# Patient Record
Sex: MALE | Race: WHITE | NOT HISPANIC OR LATINO | Employment: PART TIME | URBAN - METROPOLITAN AREA
[De-identification: names, ages, dates, MRNs, and addresses within clinical notes are randomized per-mention and may not be internally consistent; named-entity substitution may affect disease eponyms.]

---

## 2017-01-09 ENCOUNTER — HOSPITAL ENCOUNTER (OUTPATIENT)
Dept: MRI IMAGING | Facility: HOSPITAL | Age: 22
Discharge: HOME | End: 2017-01-09

## 2017-01-09 ENCOUNTER — HOSPITAL ENCOUNTER (OUTPATIENT)
Dept: MRI IMAGING | Facility: HOSPITAL | Age: 22
Discharge: HOME | End: 2017-01-09
Admitting: PSYCHIATRY & NEUROLOGY

## 2017-01-09 DIAGNOSIS — R51.9 HEADACHE, UNSPECIFIED HEADACHE TYPE: ICD-10-CM

## 2017-01-09 DIAGNOSIS — G93.5 CHIARI I MALFORMATION (HCC): ICD-10-CM

## 2017-01-09 PROCEDURE — 72141 MRI NECK SPINE W/O DYE: CPT

## 2017-01-09 PROCEDURE — 70551 MRI BRAIN STEM W/O DYE: CPT

## 2017-01-09 PROCEDURE — 72146 MRI CHEST SPINE W/O DYE: CPT

## 2018-05-16 ENCOUNTER — TRANSCRIBE ORDERS (OUTPATIENT)
Dept: ADMINISTRATIVE | Facility: HOSPITAL | Age: 23
End: 2018-05-16

## 2018-05-16 DIAGNOSIS — G93.5 CHIARI MALFORMATION TYPE I (HCC): Primary | ICD-10-CM

## 2018-05-18 ENCOUNTER — APPOINTMENT (OUTPATIENT)
Dept: MRI IMAGING | Facility: HOSPITAL | Age: 23
End: 2018-05-18

## 2018-07-12 ENCOUNTER — HOSPITAL ENCOUNTER (OUTPATIENT)
Dept: MRI IMAGING | Facility: HOSPITAL | Age: 23
Discharge: HOME | End: 2018-07-12
Admitting: PSYCHIATRY & NEUROLOGY

## 2018-07-12 DIAGNOSIS — G93.5 CHIARI MALFORMATION TYPE I (HCC): ICD-10-CM

## 2018-07-12 PROCEDURE — 70551 MRI BRAIN STEM W/O DYE: CPT

## 2018-07-30 RX ORDER — TIZANIDINE 2 MG/1
TABLET ORAL
COMMUNITY
End: 2018-07-30 | Stop reason: SDUPTHER

## 2018-07-30 RX ORDER — TIZANIDINE 2 MG/1
TABLET ORAL
COMMUNITY
End: 2018-07-31

## 2018-07-31 ENCOUNTER — OFFICE VISIT (OUTPATIENT)
Dept: NEUROSURGERY | Facility: CLINIC | Age: 23
End: 2018-07-31

## 2018-07-31 VITALS
DIASTOLIC BLOOD PRESSURE: 72 MMHG | HEIGHT: 72 IN | WEIGHT: 202.6 LBS | TEMPERATURE: 98.6 F | BODY MASS INDEX: 27.44 KG/M2 | SYSTOLIC BLOOD PRESSURE: 128 MMHG

## 2018-07-31 DIAGNOSIS — Q07.00 ARNOLD-CHIARI MALFORMATION (HCC): Primary | ICD-10-CM

## 2018-07-31 PROCEDURE — 99243 OFF/OP CNSLTJ NEW/EST LOW 30: CPT | Performed by: NEUROLOGICAL SURGERY

## 2018-07-31 RX ORDER — DIVALPROEX SODIUM 250 MG/1
TABLET, DELAYED RELEASE ORAL
Refills: 3 | COMMUNITY
Start: 2018-07-12 | End: 2022-05-05

## 2018-07-31 NOTE — PATIENT INSTRUCTIONS
If you need anything or have questions call Dr. Zambrano on a Monday or Tuesday with an update.   Ask for  Stacy  and leave a message for  Dr. Zambrano.  He will call you back at the end of the day as soon as he can.     [phone]

## 2018-07-31 NOTE — PROGRESS NOTES
Destin Newman  1995  [id number]      Chief Complaint   Patient presents with   • Headache   • Dizziness   • Extremity Weakness       HISTORY OF PRESENT ILLNESS:   This is a 23-year-old male who presents with classic symptoms of a symptomatic type to Arnold-Chiari malformation.  He has headaches, vertigo, occasionally will lose control of his arms and legs, fainting disc, has had difficulty at times with swallowing and tools.  The symptoms are worse with any type of Valsalva maneuver such as sneezing coughing bowel movements or bending over.  MRI was performed and shows the presence of type II Chiari malformation he is referred for neurosurgical consultation.    Past Medical History:   Diagnosis Date   • HA (headache)        History reviewed. No pertinent surgical history.    Family History   Problem Relation Age of Onset   • Diabetes Father    • Hypertension Father    • Cancer Other        Social History     Social History   • Marital status: Single     Spouse name: N/A   • Number of children: N/A   • Years of education: N/A     Occupational History   • Not on file.     Social History Main Topics   • Smoking status: Never Smoker   • Smokeless tobacco: Never Used   • Alcohol use Yes   • Drug use: No   • Sexual activity: Defer     Other Topics Concern   • Not on file     Social History Narrative   • No narrative on file       No Known Allergies      Current Outpatient Prescriptions:   •  divalproex (DEPAKOTE) 250 MG DR tablet, TAKE 1 TABLET BY MOUTH EVERYDAY AT BEDTIME, Disp: , Rfl: 3    Review of Systems   Constitutional: Negative for activity change, appetite change, chills, diaphoresis, fatigue, fever and unexpected weight change.   HENT: Positive for drooling, tinnitus and trouble swallowing. Negative for congestion, dental problem, ear discharge, ear pain, facial swelling, hearing loss, mouth sores, nosebleeds, postnasal drip, rhinorrhea, sinus pressure, sneezing, sore throat and voice change.    Eyes:  "Negative for photophobia, pain, discharge, redness, itching and visual disturbance.   Respiratory: Negative for apnea, cough, choking, chest tightness, shortness of breath, wheezing and stridor.    Cardiovascular: Negative for chest pain, palpitations and leg swelling.   Gastrointestinal: Negative for abdominal distention, abdominal pain, anal bleeding, blood in stool, constipation, diarrhea, nausea, rectal pain and vomiting.   Endocrine: Negative for cold intolerance, heat intolerance, polydipsia, polyphagia and polyuria.   Genitourinary: Negative for decreased urine volume, difficulty urinating, dysuria, enuresis, flank pain, frequency, genital sores, hematuria and urgency.   Musculoskeletal: Negative for arthralgias, back pain, gait problem, joint swelling, myalgias, neck pain and neck stiffness.   Skin: Negative for color change, pallor, rash and wound.   Allergic/Immunologic: Negative for environmental allergies, food allergies and immunocompromised state.   Neurological: Positive for dizziness, weakness, light-headedness and headaches. Negative for tremors, seizures, syncope, facial asymmetry, speech difficulty and numbness.   Hematological: Negative for adenopathy. Does not bruise/bleed easily.   Psychiatric/Behavioral: Negative for agitation, behavioral problems, confusion, decreased concentration, dysphoric mood, hallucinations, self-injury, sleep disturbance and suicidal ideas. The patient is not nervous/anxious and is not hyperactive.        Vitals:    07/31/18 1351   BP: 128/72   BP Location: Left arm   Patient Position: Sitting   Cuff Size: Adult   Temp: 98.6 °F (37 °C)   TempSrc: Temporal Artery    Weight: 91.9 kg (202 lb 9.6 oz)   Height: 182.9 cm (72.01\")       Neurological Examination:    Mental status/speech: The patient is alert and oriented.  Speech is clear without aphysia or dysarthria.  No overt cognitive deficits.    Cranial nerve examination:    Olfaction: Smell is intact.  Vision: Vision is " intact without visual field abnormalities.  Funduscopic examination is normal.  No pupillary irregularity.  Ocular motor examination: The extraocular muscles are intact.  There is no diplopia.  The pupil is round and reactive to both light and accommodation.  There is no nystagmus.  Facial movement/sensation: There is no facial weakness.  Sensation is intact in the first, second, and third divisions of the trigeminal nerve.  The corneal reflex is intact.  Auditory: Hearing is intact to finger rub bilaterally.  Cranial nerves IX, X, XI, XII: Phonation is normal.  No dysphagia.  Tongue is protruded in the midline without atrophy.  The gag reflex is intact.  Shoulder shrug is normal.    Musculoligamentous ligamentous examination:  He is hyperreflexic both in the upper and lower extremities.  His gait is a little bit wide-based.  His strength is intact without sensory loss.    Medical Decision Making:     Diagnostic Data Set:   The MRI data set show a type II Arnold-Chiari malformation.  There is no hydrocephalus.  He is had a cervical MRI performed in 2016 which shows no syringomyelia.      Assessment:  Arnold-Chiari malformation, type II          Recommendations:   I've recommended decompression and grafting.  They will take this under advisement and contact us when they have made her decision.  I do think this should be done question.  His symptoms are quite characteristic and are of some concern.        I greatly appreciate the opportunity to see and evaluate this individual.  If you have questions or concerns regarding issues that I may have overlooked please call me at any time: [phone].  Dutch Zambrano M.D.  [facility]  [address] [place] [address]    Scribed for Dawson Zambrano MD by Elfego Rae CMA. 7/31/2018  2:03 PM     I have read and concur with the information provided by the scribe.  Dawson Zambrano MD

## 2018-08-02 ENCOUNTER — PREP FOR SURGERY (OUTPATIENT)
Dept: OTHER | Facility: HOSPITAL | Age: 23
End: 2018-08-02

## 2018-08-02 DIAGNOSIS — Q07.01 ARNOLD-CHIARI MALFORMATION, TYPE II (HCC): Primary | ICD-10-CM

## 2018-08-02 DIAGNOSIS — G44.89 OTHER HEADACHE SYNDROME: ICD-10-CM

## 2018-08-02 DIAGNOSIS — R42 VERTIGO: ICD-10-CM

## 2018-08-02 RX ORDER — CEFAZOLIN SODIUM 2 G/100ML
2 INJECTION, SOLUTION INTRAVENOUS ONCE
Status: CANCELLED | OUTPATIENT
Start: 2018-08-02 | End: 2018-08-02

## 2018-08-02 RX ORDER — ACETAMINOPHEN 325 MG/1
650 TABLET ORAL ONCE
Status: CANCELLED | OUTPATIENT
Start: 2018-08-02 | End: 2018-08-02

## 2018-08-02 RX ORDER — CHLORHEXIDINE GLUCONATE 0.12 MG/ML
15 RINSE ORAL EVERY 12 HOURS SCHEDULED
Status: CANCELLED | OUTPATIENT
Start: 2018-08-02 | End: 2019-08-02

## 2018-08-02 RX ORDER — SODIUM CHLORIDE, SODIUM LACTATE, POTASSIUM CHLORIDE, CALCIUM CHLORIDE 600; 310; 30; 20 MG/100ML; MG/100ML; MG/100ML; MG/100ML
100 INJECTION, SOLUTION INTRAVENOUS CONTINUOUS
Status: CANCELLED | OUTPATIENT
Start: 2018-08-02 | End: 2019-08-02

## 2018-08-02 RX ORDER — SODIUM CHLORIDE 0.9 % (FLUSH) 0.9 %
1-10 SYRINGE (ML) INJECTION AS NEEDED
Status: CANCELLED | OUTPATIENT
Start: 2018-08-02 | End: 2019-08-02

## 2018-08-02 RX ORDER — CHLORHEXIDINE GLUCONATE 4 G/100ML
SOLUTION TOPICAL
Qty: 120 ML | Refills: 0 | Status: SHIPPED | OUTPATIENT
Start: 2018-08-02 | End: 2022-05-05

## 2018-08-02 RX ORDER — IBUPROFEN 800 MG/1
800 TABLET ORAL ONCE
Status: CANCELLED | OUTPATIENT
Start: 2018-08-02 | End: 2018-08-02

## 2018-08-02 RX ORDER — HYDROCODONE BITARTRATE AND ACETAMINOPHEN 7.5; 325 MG/1; MG/1
1 TABLET ORAL ONCE
Status: CANCELLED | OUTPATIENT
Start: 2018-08-02 | End: 2018-08-02

## 2018-08-02 NOTE — H&P
Destin Newman  1995  [id number]            Chief Complaint   Patient presents with   • Headache   • Dizziness   • Extremity Weakness         HISTORY OF PRESENT ILLNESS:   This is a 23-year-old male who presents with classic symptoms of a symptomatic type to Arnold-Chiari malformation.  He has headaches, vertigo, occasionally will lose control of his arms and legs, fainting disc, has had difficulty at times with swallowing and tools.  The symptoms are worse with any type of Valsalva maneuver such as sneezing coughing bowel movements or bending over.  MRI was performed and shows the presence of type II Chiari malformation he is referred for neurosurgical consultation.     Medical History        Past Medical History:   Diagnosis Date   • HA (headache)              Surgical History   History reviewed. No pertinent surgical history.              Family History   Problem Relation Age of Onset   • Diabetes Father     • Hypertension Father     • Cancer Other           Social History   Social History            Social History   • Marital status: Single       Spouse name: N/A   • Number of children: N/A   • Years of education: N/A          Occupational History   • Not on file.           Social History Main Topics   • Smoking status: Never Smoker   • Smokeless tobacco: Never Used   • Alcohol use Yes   • Drug use: No   • Sexual activity: Defer           Other Topics Concern   • Not on file          Social History Narrative   • No narrative on file            No Known Allergies        Current Outpatient Prescriptions:   •  divalproex (DEPAKOTE) 250 MG DR tablet, TAKE 1 TABLET BY MOUTH EVERYDAY AT BEDTIME, Disp: , Rfl: 3     Review of Systems   Constitutional: Negative for activity change, appetite change, chills, diaphoresis, fatigue, fever and unexpected weight change.   HENT: Positive for drooling, tinnitus and trouble swallowing. Negative for congestion, dental problem, ear discharge, ear pain, facial swelling, hearing  "loss, mouth sores, nosebleeds, postnasal drip, rhinorrhea, sinus pressure, sneezing, sore throat and voice change.    Eyes: Negative for photophobia, pain, discharge, redness, itching and visual disturbance.   Respiratory: Negative for apnea, cough, choking, chest tightness, shortness of breath, wheezing and stridor.    Cardiovascular: Negative for chest pain, palpitations and leg swelling.   Gastrointestinal: Negative for abdominal distention, abdominal pain, anal bleeding, blood in stool, constipation, diarrhea, nausea, rectal pain and vomiting.   Endocrine: Negative for cold intolerance, heat intolerance, polydipsia, polyphagia and polyuria.   Genitourinary: Negative for decreased urine volume, difficulty urinating, dysuria, enuresis, flank pain, frequency, genital sores, hematuria and urgency.   Musculoskeletal: Negative for arthralgias, back pain, gait problem, joint swelling, myalgias, neck pain and neck stiffness.   Skin: Negative for color change, pallor, rash and wound.   Allergic/Immunologic: Negative for environmental allergies, food allergies and immunocompromised state.   Neurological: Positive for dizziness, weakness, light-headedness and headaches. Negative for tremors, seizures, syncope, facial asymmetry, speech difficulty and numbness.   Hematological: Negative for adenopathy. Does not bruise/bleed easily.   Psychiatric/Behavioral: Negative for agitation, behavioral problems, confusion, decreased concentration, dysphoric mood, hallucinations, self-injury, sleep disturbance and suicidal ideas. The patient is not nervous/anxious and is not hyperactive.          Vitals       Vitals:     07/31/18 1351   BP: 128/72   BP Location: Left arm   Patient Position: Sitting   Cuff Size: Adult   Temp: 98.6 °F (37 °C)   TempSrc: Temporal Artery    Weight: 91.9 kg (202 lb 9.6 oz)   Height: 182.9 cm (72.01\")            Neurological Examination:     Mental status/speech: The patient is alert and oriented.  Speech is " clear without aphysia or dysarthria.  No overt cognitive deficits.     Cranial nerve examination:     Olfaction: Smell is intact.  Vision: Vision is intact without visual field abnormalities.  Funduscopic examination is normal.  No pupillary irregularity.  Ocular motor examination: The extraocular muscles are intact.  There is no diplopia.  The pupil is round and reactive to both light and accommodation.  There is no nystagmus.  Facial movement/sensation: There is no facial weakness.  Sensation is intact in the first, second, and third divisions of the trigeminal nerve.  The corneal reflex is intact.  Auditory: Hearing is intact to finger rub bilaterally.  Cranial nerves IX, X, XI, XII: Phonation is normal.  No dysphagia.  Tongue is protruded in the midline without atrophy.  The gag reflex is intact.  Shoulder shrug is normal.     Musculoligamentous ligamentous examination:  He is hyperreflexic both in the upper and lower extremities.  His gait is a little bit wide-based.  His strength is intact without sensory loss.     Medical Decision Making:                 Diagnostic Data Set:   The MRI data set show a type II Arnold-Chiari malformation.  There is no hydrocephalus.  He is had a cervical MRI performed in 2016 which shows no syringomyelia.                             Assessment:  Arnold-Chiari malformation, type II                                                                            Recommendations:   I've recommended decompression and grafting.  They will take this under advisement and contact us when they have made her decision.  I do think this should be done question.  His symptoms are quite characteristic and are of some concern.    The patient is ready to proceed with surgical decompression. He will be scheduled for posterior fossa craniectomy.    Lottie Connelly PA-C

## 2018-09-21 ENCOUNTER — APPOINTMENT (OUTPATIENT)
Dept: MRI IMAGING | Facility: HOSPITAL | Age: 23
End: 2018-09-21

## 2018-09-21 ENCOUNTER — APPOINTMENT (OUTPATIENT)
Dept: GENERAL RADIOLOGY | Facility: HOSPITAL | Age: 23
End: 2018-09-21

## 2018-09-21 ENCOUNTER — HOSPITAL ENCOUNTER (EMERGENCY)
Facility: HOSPITAL | Age: 23
Discharge: HOME | End: 2018-09-21
Attending: EMERGENCY MEDICINE | Admitting: EMERGENCY MEDICINE

## 2018-09-21 VITALS
TEMPERATURE: 98.2 F | SYSTOLIC BLOOD PRESSURE: 118 MMHG | WEIGHT: 200 LBS | HEART RATE: 95 BPM | HEIGHT: 72 IN | OXYGEN SATURATION: 97 % | DIASTOLIC BLOOD PRESSURE: 83 MMHG | BODY MASS INDEX: 27.09 KG/M2 | RESPIRATION RATE: 18 BRPM

## 2018-09-21 DIAGNOSIS — R51.9 ACUTE NONINTRACTABLE HEADACHE, UNSPECIFIED HEADACHE TYPE: ICD-10-CM

## 2018-09-21 DIAGNOSIS — R20.2 PARESTHESIA: ICD-10-CM

## 2018-09-21 DIAGNOSIS — Q07.00 ARNOLD-CHIARI MALFORMATION (HCC): Primary | ICD-10-CM

## 2018-09-21 LAB
ALBUMIN SERPL-MCNC: 4.82 G/DL (ref 3.2–4.8)
ALBUMIN/GLOB SERPL: 1.6 G/DL (ref 1.5–2.5)
ALP SERPL-CCNC: 87 U/L (ref 25–100)
ALT SERPL W P-5'-P-CCNC: 35 U/L (ref 7–40)
ANION GAP SERPL CALCULATED.3IONS-SCNC: 9 MMOL/L (ref 3–11)
AST SERPL-CCNC: 20 U/L (ref 0–33)
BASOPHILS # BLD AUTO: 0.03 10*3/MM3 (ref 0–0.2)
BASOPHILS NFR BLD AUTO: 0.4 % (ref 0–1)
BILIRUB SERPL-MCNC: 0.5 MG/DL (ref 0.3–1.2)
BUN BLD-MCNC: 11 MG/DL (ref 9–23)
BUN/CREAT SERPL: 10.3 (ref 7–25)
CALCIUM SPEC-SCNC: 9.4 MG/DL (ref 8.7–10.4)
CHLORIDE SERPL-SCNC: 103 MMOL/L (ref 99–109)
CO2 SERPL-SCNC: 26 MMOL/L (ref 20–31)
CREAT BLD-MCNC: 1.07 MG/DL (ref 0.6–1.3)
DEPRECATED RDW RBC AUTO: 38.2 FL (ref 37–54)
EOSINOPHIL # BLD AUTO: 0.6 10*3/MM3 (ref 0–0.3)
EOSINOPHIL NFR BLD AUTO: 7.6 % (ref 0–3)
ERYTHROCYTE [DISTWIDTH] IN BLOOD BY AUTOMATED COUNT: 12.6 % (ref 11.3–14.5)
GFR SERPL CREATININE-BSD FRML MDRD: 86 ML/MIN/1.73
GLOBULIN UR ELPH-MCNC: 3.1 GM/DL
GLUCOSE BLD-MCNC: 87 MG/DL (ref 70–100)
HCT VFR BLD AUTO: 44.4 % (ref 38.9–50.9)
HGB BLD-MCNC: 15 G/DL (ref 13.1–17.5)
HOLD SPECIMEN: NORMAL
HOLD SPECIMEN: NORMAL
IMM GRANULOCYTES # BLD: 0.02 10*3/MM3 (ref 0–0.03)
IMM GRANULOCYTES NFR BLD: 0.3 % (ref 0–0.6)
LYMPHOCYTES # BLD AUTO: 2.87 10*3/MM3 (ref 0.6–4.8)
LYMPHOCYTES NFR BLD AUTO: 36.3 % (ref 24–44)
MCH RBC QN AUTO: 28.6 PG (ref 27–31)
MCHC RBC AUTO-ENTMCNC: 33.8 G/DL (ref 32–36)
MCV RBC AUTO: 84.6 FL (ref 80–99)
MONOCYTES # BLD AUTO: 0.68 10*3/MM3 (ref 0–1)
MONOCYTES NFR BLD AUTO: 8.6 % (ref 0–12)
NEUTROPHILS # BLD AUTO: 3.73 10*3/MM3 (ref 1.5–8.3)
NEUTROPHILS NFR BLD AUTO: 47.1 % (ref 41–71)
PLATELET # BLD AUTO: 336 10*3/MM3 (ref 150–450)
PMV BLD AUTO: 9.5 FL (ref 6–12)
POTASSIUM BLD-SCNC: 3.3 MMOL/L (ref 3.5–5.5)
PROT SERPL-MCNC: 7.9 G/DL (ref 5.7–8.2)
RBC # BLD AUTO: 5.25 10*6/MM3 (ref 4.2–5.76)
SODIUM BLD-SCNC: 138 MMOL/L (ref 132–146)
TROPONIN I SERPL-MCNC: 0 NG/ML (ref 0–0.07)
WBC NRBC COR # BLD: 7.91 10*3/MM3 (ref 3.5–10.8)
WHOLE BLOOD HOLD SPECIMEN: NORMAL
WHOLE BLOOD HOLD SPECIMEN: NORMAL

## 2018-09-21 PROCEDURE — 85025 COMPLETE CBC W/AUTO DIFF WBC: CPT | Performed by: NURSE PRACTITIONER

## 2018-09-21 PROCEDURE — 99284 EMERGENCY DEPT VISIT MOD MDM: CPT

## 2018-09-21 PROCEDURE — 96374 THER/PROPH/DIAG INJ IV PUSH: CPT

## 2018-09-21 PROCEDURE — 25010000002 DEXAMETHASONE: Performed by: NURSE PRACTITIONER

## 2018-09-21 PROCEDURE — A9577 INJ MULTIHANCE: HCPCS | Performed by: EMERGENCY MEDICINE

## 2018-09-21 PROCEDURE — 96361 HYDRATE IV INFUSION ADD-ON: CPT

## 2018-09-21 PROCEDURE — 72156 MRI NECK SPINE W/O & W/DYE: CPT

## 2018-09-21 PROCEDURE — 80053 COMPREHEN METABOLIC PANEL: CPT | Performed by: NURSE PRACTITIONER

## 2018-09-21 PROCEDURE — 0 GADOBENATE DIMEGLUMINE 529 MG/ML SOLUTION: Performed by: EMERGENCY MEDICINE

## 2018-09-21 PROCEDURE — 25010000002 METOCLOPRAMIDE PER 10 MG: Performed by: NURSE PRACTITIONER

## 2018-09-21 PROCEDURE — 70553 MRI BRAIN STEM W/O & W/DYE: CPT

## 2018-09-21 PROCEDURE — 93005 ELECTROCARDIOGRAM TRACING: CPT | Performed by: EMERGENCY MEDICINE

## 2018-09-21 PROCEDURE — 25010000002 KETOROLAC TROMETHAMINE PER 15 MG: Performed by: NURSE PRACTITIONER

## 2018-09-21 PROCEDURE — 71045 X-RAY EXAM CHEST 1 VIEW: CPT

## 2018-09-21 PROCEDURE — 84484 ASSAY OF TROPONIN QUANT: CPT

## 2018-09-21 PROCEDURE — 96375 TX/PRO/DX INJ NEW DRUG ADDON: CPT

## 2018-09-21 RX ORDER — KETOROLAC TROMETHAMINE 15 MG/ML
15 INJECTION, SOLUTION INTRAMUSCULAR; INTRAVENOUS ONCE
Status: COMPLETED | OUTPATIENT
Start: 2018-09-21 | End: 2018-09-21

## 2018-09-21 RX ORDER — BUTALBITAL, ACETAMINOPHEN AND CAFFEINE 50; 325; 40 MG/1; MG/1; MG/1
1 TABLET ORAL EVERY 12 HOURS PRN
COMMUNITY
End: 2022-05-05

## 2018-09-21 RX ORDER — METOCLOPRAMIDE HYDROCHLORIDE 5 MG/ML
10 INJECTION INTRAMUSCULAR; INTRAVENOUS ONCE
Status: COMPLETED | OUTPATIENT
Start: 2018-09-21 | End: 2018-09-21

## 2018-09-21 RX ORDER — DEXAMETHASONE 4 MG/1
4 TABLET ORAL 3 TIMES DAILY
Qty: 18 TABLET | Refills: 0 | Status: SHIPPED | OUTPATIENT
Start: 2018-09-21 | End: 2022-05-05

## 2018-09-21 RX ORDER — SODIUM CHLORIDE 0.9 % (FLUSH) 0.9 %
10 SYRINGE (ML) INJECTION AS NEEDED
Status: DISCONTINUED | OUTPATIENT
Start: 2018-09-21 | End: 2018-09-21 | Stop reason: HOSPADM

## 2018-09-21 RX ADMIN — METOCLOPRAMIDE 10 MG: 5 INJECTION, SOLUTION INTRAMUSCULAR; INTRAVENOUS at 18:25

## 2018-09-21 RX ADMIN — KETOROLAC TROMETHAMINE 15 MG: 15 INJECTION, SOLUTION INTRAMUSCULAR; INTRAVENOUS at 18:25

## 2018-09-21 RX ADMIN — SODIUM CHLORIDE 1000 ML: 9 INJECTION, SOLUTION INTRAVENOUS at 18:24

## 2018-09-21 RX ADMIN — GADOBENATE DIMEGLUMINE 19 ML: 529 INJECTION, SOLUTION INTRAVENOUS at 17:08

## 2018-09-21 RX ADMIN — DEXAMETHASONE SODIUM PHOSPHATE 10 MG: 10 INJECTION INTRAMUSCULAR; INTRAVENOUS at 19:05

## 2018-09-21 NOTE — ED PROVIDER NOTES
Subjective   Mr. Destin Newman is a 23 y.o. male who presents to the ED with c/o left sided numbness. He reports that he has had a headache all day but had been feeling okay until around an hour ago when he developed blurred vision in his left eye. It then resolved around 30 minutes ago but he then developed left arm numbness, left face numbness, and left arm, CP, and difficulty walking 2/2 leg weakness as well as difficulty swallowing and SoA 2/2 numbness in his throat. However, he denies abdominal pain, nausea, and vomiting. He has a hx of chiari malformation surgery on August 27th by Dr. Robin in [facility] and currently takes Fioricet for pain. He has no hx of seizures. No other acute complaints at this time.        History provided by:  Patient  Neurologic Problem   The patient's primary symptoms include a visual change and weakness. This is a new problem. The current episode started today. The neurological problem developed gradually. Last Known Well [facility]: 1 hour PTA.  The problem is unchanged. There was left-sided focality noted. Associated symptoms include chest pain, headaches and shortness of breath. Pertinent negatives include no abdominal pain, nausea or vomiting.       Review of Systems   HENT: Positive for trouble swallowing.    Respiratory: Positive for shortness of breath.    Cardiovascular: Positive for chest pain.   Gastrointestinal: Negative for abdominal pain, nausea and vomiting.   Neurological: Positive for weakness, numbness and headaches.   All other systems reviewed and are negative.      Past Medical History:   Diagnosis Date   • Chiari malformation type I (CMS/HCC)    • HA (headache)        No Known Allergies    Past Surgical History:   Procedure Laterality Date   • CERVICAL CHIARI DECOMPRESSION POSTERIOR     • CERVICAL LAMINECTOMY         Family History   Problem Relation Age of Onset   • Diabetes Father    • Hypertension Father    • Cancer Other        Social History     Social  History   • Marital status: Single     Social History Main Topics   • Smoking status: Never Smoker   • Smokeless tobacco: Never Used   • Alcohol use Yes   • Drug use: No   • Sexual activity: Defer     Other Topics Concern   • Not on file         Objective   Physical Exam   Constitutional: He is oriented to person, place, and time. He appears well-developed and well-nourished. No distress.   HENT:   Head: Normocephalic and atraumatic.   Nose: Nose normal.   Eyes: Conjunctivae are normal. No scleral icterus.   Patient has lateral nystagmus.   Neck: Normal range of motion. Neck supple.   Cardiovascular: Normal rate, regular rhythm and normal heart sounds.    No murmur heard.  Pulmonary/Chest: Effort normal and breath sounds normal. No respiratory distress.   Abdominal: Soft. There is no tenderness.   Musculoskeletal: Normal range of motion.   Neurological: He is alert and oriented to person, place, and time.   Patient has a weakened  on the left side. No pronator drift. Patient has lateral nystagmus.   Skin: Skin is warm and dry. He is not diaphoretic.   Patient has a vertical midline incision from the base of his skull down his cervical spine. The incision is clean, dry, intact, and appears to be healing well with no signs of infection.   Psychiatric: He has a normal mood and affect. His behavior is normal.   Nursing note and vitals reviewed.      Procedures         ED Course     Recent Results (from the past 24 hour(s))   POC Troponin, Rapid    Collection Time: 09/21/18  2:53 PM   Result Value Ref Range    Troponin I 0.00 0.00 - 0.07 ng/mL   Comprehensive Metabolic Panel    Collection Time: 09/21/18  3:32 PM   Result Value Ref Range    Glucose 87 70 - 100 mg/dL    BUN 11 9 - 23 mg/dL    Creatinine 1.07 0.60 - 1.30 mg/dL    Sodium 138 132 - 146 mmol/L    Potassium 3.3 (L) 3.5 - 5.5 mmol/L    Chloride 103 99 - 109 mmol/L    CO2 26.0 20.0 - 31.0 mmol/L    Calcium 9.4 8.7 - 10.4 mg/dL    Total Protein 7.9 5.7 - 8.2  g/dL    Albumin 4.82 (H) 3.20 - 4.80 g/dL    ALT (SGPT) 35 7 - 40 U/L    AST (SGOT) 20 0 - 33 U/L    Alkaline Phosphatase 87 25 - 100 U/L    Total Bilirubin 0.5 0.3 - 1.2 mg/dL    eGFR Non African Amer 86 >60 mL/min/1.73    Globulin 3.1 gm/dL    A/G Ratio 1.6 1.5 - 2.5 g/dL    BUN/Creatinine Ratio 10.3 7.0 - 25.0    Anion Gap 9.0 3.0 - 11.0 mmol/L   Light Blue Top    Collection Time: 09/21/18  3:32 PM   Result Value Ref Range    Extra Tube hold for add-on    Green Top (Gel)    Collection Time: 09/21/18  3:32 PM   Result Value Ref Range    Extra Tube Hold for add-ons.    Lavender Top    Collection Time: 09/21/18  3:32 PM   Result Value Ref Range    Extra Tube hold for add-on    Gold Top - SST    Collection Time: 09/21/18  3:32 PM   Result Value Ref Range    Extra Tube Hold for add-ons.    CBC Auto Differential    Collection Time: 09/21/18  3:32 PM   Result Value Ref Range    WBC 7.91 3.50 - 10.80 10*3/mm3    RBC 5.25 4.20 - 5.76 10*6/mm3    Hemoglobin 15.0 13.1 - 17.5 g/dL    Hematocrit 44.4 38.9 - 50.9 %    MCV 84.6 80.0 - 99.0 fL    MCH 28.6 27.0 - 31.0 pg    MCHC 33.8 32.0 - 36.0 g/dL    RDW 12.6 11.3 - 14.5 %    RDW-SD 38.2 37.0 - 54.0 fl    MPV 9.5 6.0 - 12.0 fL    Platelets 336 150 - 450 10*3/mm3    Neutrophil % 47.1 41.0 - 71.0 %    Lymphocyte % 36.3 24.0 - 44.0 %    Monocyte % 8.6 0.0 - 12.0 %    Eosinophil % 7.6 (H) 0.0 - 3.0 %    Basophil % 0.4 0.0 - 1.0 %    Immature Grans % 0.3 0.0 - 0.6 %    Neutrophils, Absolute 3.73 1.50 - 8.30 10*3/mm3    Lymphocytes, Absolute 2.87 0.60 - 4.80 10*3/mm3    Monocytes, Absolute 0.68 0.00 - 1.00 10*3/mm3    Eosinophils, Absolute 0.60 (H) 0.00 - 0.30 10*3/mm3    Basophils, Absolute 0.03 0.00 - 0.20 10*3/mm3    Immature Grans, Absolute 0.02 0.00 - 0.03 10*3/mm3     Note: In addition to lab results from this visit, the labs listed above may include labs taken at another facility or during a different encounter within the last 24 hours. Please correlate lab times with ED  "admission and discharge times for further clarification of the services performed during this visit.    MRI Cervical Spine With & Without Contrast   Final Result   1. Chiari malformation.   2. No significant cervical pathology or intrinsic cord signal   abnormality.   3. No evidence of syrinx.        DICTATED:   9/21/2018   EDITED/ls :   9/21/2018        This report was finalized on 9/21/2018 6:21 PM by Dr. Rai Haider.          MRI Brain With & Without Contrast   Final Result   Redemonstration of descended cerebellar tonsils below the   level of the foramen magnum with crowding of the cervicomedullary   junction without cord edema consistent with Chiari malformation similar   to prior comparison. No hydrocephalus. No supratentorial white matter   disease aberration.        DICTATED:   9/21/2018   EDITED/ls :   9/21/2018        This report was finalized on 9/21/2018 6:21 PM by Dr. Rai Haider.          XR Chest 1 View   Preliminary Result   No acute chest disease seen.       D:  09/21/2018   E:  09/21/2018                    Vitals:    09/21/18 1420 09/21/18 1434 09/21/18 1533 09/21/18 1810   BP: 136/78      BP Location: Right arm      Patient Position: Sitting      Pulse: 95      Resp: 18 20 16 18   Temp: 98.2 °F (36.8 °C)      TempSrc: Oral      SpO2:  92%     Weight: 90.7 kg (200 lb)      Height: 182.9 cm (72\")        Medications   sodium chloride 0.9 % flush 10 mL (not administered)   sodium chloride 0.9 % bolus 1,000 mL (1,000 mL Intravenous New Bag 9/21/18 1824)   gadobenate dimeglumine (MULTIHANCE) injection 19 mL (19 mL Intravenous Given 9/21/18 1708)   ketorolac (TORADOL) injection 15 mg (15 mg Intravenous Given 9/21/18 1825)   metoclopramide (REGLAN) injection 10 mg (10 mg Intravenous Given 9/21/18 1825)     ECG/EMG Results (last 24 hours)     Procedure Component Value Units Date/Time    ECG 12 Lead [[id number]] Collected:  09/21/18 1440     Updated:  09/21/18 1459    Narrative:       Test Reason : " Protocol  Blood Pressure : **/** mmHG  Vent. Rate : 089 BPM     Atrial Rate : 089 BPM     P-R Int : 128 ms          QRS Dur : 098 ms      QT Int : 378 ms       P-R-T Axes : 055 044 043 degrees     QTc Int : 459 ms    Normal sinus rhythm  Normal ECG  No previous ECGs available  Confirmed by HAILE MUHAMMAD MD ([id number]) on 9/21/2018 2:59:37 PM    Referred By:  ED MD           Confirmed By:HAILE MUHAMMAD MD        Spoke with the assistant for , and she was aware of the patient's situation and that he was coming to the ER for evaluation.  Discussed MRI findings with her, and she was not aware of any specific plans for the patient but she gave me his cell phone number for Dr. Calvin.  I called and left a voicemail to return [email or web address].    Re-examination: Patient sitting upright in bed in no acute distress.  States that his headache is much better after the medication.  Discussed all MRI findings and lab findings with patient and mother.  Discussed treatment plan and spoke with patient and family.  All agreeable and verbalized understanding of all discussed.    After discussing pt case with Dr. Muhammad, will discharge pt in stable condition and pt is to go to [facility] to be evaulated by Dr. Calvin at [facility] at .      1905: Spoke with  patient's neurosurgeon and discussed patient case and MRI findings and lab findings with physician.  He recommends patient follow up in his office on Monday and request that I provide the patient with a Decadron taper prescription.        MDM    Final diagnoses:   Arnold-Chiari malformation (CMS/HCC)   Acute nonintractable headache, unspecified headache type   Paresthesia       Documentation assistance provided by ana Morfin.  Information recorded by the ana was done at my direction and has been verified and validated by me.     Coretta Morfin  09/21/18 0402       Karla Galeana APRN  09/21/18 1848       Karla Galeana APRN  09/21/18 1918

## 2019-12-18 ENCOUNTER — TRANSCRIBE ORDERS (OUTPATIENT)
Dept: ADMINISTRATIVE | Facility: HOSPITAL | Age: 24
End: 2019-12-18

## 2019-12-18 ENCOUNTER — HOSPITAL ENCOUNTER (OUTPATIENT)
Dept: GENERAL RADIOLOGY | Facility: HOSPITAL | Age: 24
Discharge: HOME | End: 2019-12-18
Admitting: INTERNAL MEDICINE

## 2019-12-18 DIAGNOSIS — I50.9 CHF (CONGESTIVE HEART FAILURE), NYHA CLASS I, UNSPECIFIED FAILURE CHRONICITY, UNSPECIFIED TYPE (HCC): Primary | ICD-10-CM

## 2019-12-18 DIAGNOSIS — M54.2 CERVICAL PAIN (NECK): ICD-10-CM

## 2019-12-18 DIAGNOSIS — M54.2 CERVICAL PAIN (NECK): Primary | ICD-10-CM

## 2019-12-18 PROCEDURE — 72052 X-RAY EXAM NECK SPINE 6/>VWS: CPT

## 2025-08-26 ENCOUNTER — OFFICE VISIT (OUTPATIENT)
Age: 30
End: 2025-08-26
Payer: COMMERCIAL

## 2025-08-26 VITALS — HEIGHT: 72 IN | WEIGHT: 212 LBS | BODY MASS INDEX: 28.71 KG/M2

## 2025-08-26 DIAGNOSIS — Z30.09 UNWANTED FERTILITY: Primary | ICD-10-CM

## 2025-08-26 PROCEDURE — 99203 OFFICE O/P NEW LOW 30 MIN: CPT | Performed by: UROLOGY

## 2025-08-26 RX ORDER — EMTRICITABINE AND TENOFOVIR ALAFENAMIDE 200; 25 MG/1; MG/1
1 TABLET ORAL DAILY
COMMUNITY
Start: 2025-08-04

## 2025-08-26 RX ORDER — DOXYCYCLINE HYCLATE 100 MG
100 TABLET ORAL AS NEEDED
COMMUNITY
Start: 2025-08-04